# Patient Record
Sex: FEMALE | Race: WHITE | NOT HISPANIC OR LATINO | Employment: OTHER | URBAN - METROPOLITAN AREA
[De-identification: names, ages, dates, MRNs, and addresses within clinical notes are randomized per-mention and may not be internally consistent; named-entity substitution may affect disease eponyms.]

---

## 2019-08-16 ENCOUNTER — TELEPHONE (OUTPATIENT)
Dept: CARDIOLOGY | Facility: CLINIC | Age: 74
End: 2019-08-16

## 2019-08-16 NOTE — TELEPHONE ENCOUNTER
Pt left  asking about labs and CT scan for her AAA. S/W pt advised to go get the labs done now so they can check her liver and kidney function before getting the CT scan so everything can  Be done before her appt in [place] on 8/27. Dr. Faustin will Devi give her an order for CT or do you need to put in epic?

## 2019-09-17 ENCOUNTER — OUTSIDE FACILITY SERVICE (OUTPATIENT)
Dept: CARDIOLOGY | Facility: CLINIC | Age: 74
End: 2019-09-17

## 2019-09-17 PROCEDURE — 99214 OFFICE O/P EST MOD 30 MIN: CPT | Performed by: INTERNAL MEDICINE

## 2020-02-04 ENCOUNTER — TRANSCRIBE ORDERS (OUTPATIENT)
Dept: ADMINISTRATIVE | Facility: HOSPITAL | Age: 75
End: 2020-02-04

## 2020-02-04 ENCOUNTER — LAB (OUTPATIENT)
Dept: LAB | Facility: HOSPITAL | Age: 75
End: 2020-02-04

## 2020-02-04 DIAGNOSIS — J30.9 ALLERGIC RHINITIS, UNSPECIFIED SEASONALITY, UNSPECIFIED TRIGGER: ICD-10-CM

## 2020-02-04 DIAGNOSIS — J30.9 ALLERGIC RHINITIS, UNSPECIFIED SEASONALITY, UNSPECIFIED TRIGGER: Primary | ICD-10-CM

## 2020-02-04 LAB
HOLD SPECIMEN: NORMAL

## 2020-02-04 PROCEDURE — 86003 ALLG SPEC IGE CRUDE XTRC EA: CPT

## 2020-02-04 PROCEDURE — 82785 ASSAY OF IGE: CPT

## 2020-02-04 PROCEDURE — 36415 COLL VENOUS BLD VENIPUNCTURE: CPT

## 2020-02-04 PROCEDURE — 86003 ALLG SPEC IGE CRUDE XTRC EA: CPT | Performed by: OTOLARYNGOLOGY

## 2020-02-06 LAB — TOTAL IGE SMQN RAST: 21 IU/ML (ref 6–495)

## 2020-02-07 LAB
A ALTERNATA IGE QN: <0.1 KU/L
A FUMIGATUS IGE QN: <0.1 KU/L
AMER ROACH IGE QN: <0.1 KU/L
BAHIA GRASS IGE QN: <0.1 KU/L
BAYBERRY POLN IGE QN: <0.1 KU/L
BERMUDA GRASS IGE QN: <0.1 KU/L
BOXELDER IGE QN: <0.1 KU/L
C HERBARUM IGE QN: <0.1 KU/L
CAT DANDER IGG QN: <0.1 KU/L
COMMON RAGWEED IGE QN: <0.1 KU/L
CONV CLASS DESCRIPTION: NORMAL
D FARINAE IGE QN: <0.1 KU/L
D PTERONYSS IGE QN: <0.1 KU/L
DOG DANDER IGE QN: <0.1 KU/L
DOG FENNEL IGE QN: <0.1 KU/L
E PURPURASCENS IGE QN: <0.1 KU/L
ENGL PLANTAIN IGE QN: <0.1 KU/L
F MONILIFORME IGE QN: <0.1 KU/L
GOOSEFOOT IGE QN: <0.1 KU/L
GUM-TREE IGE QN: <0.1 KU/L
ITALIAN CYPRESS IGE QN: <0.1 KU/L
JOHNSON GRASS IGE QN: <0.1 KU/L
M RACEMOSUS IGE QN: <0.1 KU/L
MEADOW FESCUE IGE QN: <0.1 KU/L
P NOTATUM IGE QN: <0.1 KU/L
PEPPER TREE IGE QN: <0.1 KU/L
PER RYE GRASS IGE QN: <0.1 KU/L
QUEEN PALM IGE QN: <0.1 KU/L
S BOTRYOSUM IGE QN: <0.1 KU/L
SHEEP SORREL IGE QN: <0.1 KU/L
T210-IGE PRIVET, COMMON: <0.1 KU/L
VIRG LIVE OAK IGE QN: <0.1 KU/L
WHITE ELM IGE QN: <0.1 KU/L

## 2020-02-10 RX ORDER — IRBESARTAN 75 MG/1
TABLET ORAL
Qty: 90 TABLET | Refills: 0 | Status: SHIPPED | OUTPATIENT
Start: 2020-02-10 | End: 2020-05-07

## 2020-05-07 RX ORDER — IRBESARTAN 75 MG/1
TABLET ORAL
Qty: 90 TABLET | Refills: 0 | Status: SHIPPED | OUTPATIENT
Start: 2020-05-07 | End: 2020-08-05

## 2020-05-26 ENCOUNTER — OUTSIDE FACILITY SERVICE (OUTPATIENT)
Dept: CARDIOLOGY | Facility: CLINIC | Age: 75
End: 2020-05-26

## 2020-05-26 PROCEDURE — 99213 OFFICE O/P EST LOW 20 MIN: CPT | Performed by: INTERNAL MEDICINE

## 2020-08-05 RX ORDER — IRBESARTAN 75 MG/1
TABLET ORAL
Qty: 90 TABLET | Refills: 0 | Status: SHIPPED | OUTPATIENT
Start: 2020-08-05 | End: 2020-09-28

## 2020-09-28 RX ORDER — IRBESARTAN 75 MG/1
TABLET ORAL
Qty: 90 TABLET | Refills: 0 | Status: SHIPPED | OUTPATIENT
Start: 2020-09-28 | End: 2020-12-03

## 2020-09-28 NOTE — TELEPHONE ENCOUNTER
LOV 5/26/20 in [place]    Labs completed 5/20/20 in [place]    Next visit is scheduled for 11/10/20

## 2020-11-09 RX ORDER — ASPIRIN 81 MG/1
81 TABLET, CHEWABLE ORAL DAILY
COMMUNITY
Start: 2016-01-19

## 2020-11-09 RX ORDER — PANTOPRAZOLE SODIUM 40 MG/1
40 TABLET, DELAYED RELEASE ORAL DAILY
COMMUNITY
Start: 2018-06-18

## 2020-11-09 RX ORDER — CHLORAL HYDRATE 500 MG
1000 CAPSULE ORAL
COMMUNITY
Start: 2016-01-19 | End: 2020-12-16

## 2020-11-09 RX ORDER — LEVOTHYROXINE SODIUM 0.1 MG/1
100 TABLET ORAL DAILY
COMMUNITY
Start: 2016-01-19 | End: 2021-06-17 | Stop reason: DRUGHIGH

## 2020-11-09 RX ORDER — ALBUTEROL SULFATE 90 UG/1
2 AEROSOL, METERED RESPIRATORY (INHALATION) EVERY 6 HOURS PRN
COMMUNITY
Start: 2016-01-19

## 2020-11-10 ENCOUNTER — OUTSIDE FACILITY SERVICE (OUTPATIENT)
Dept: CARDIOLOGY | Facility: CLINIC | Age: 75
End: 2020-11-10

## 2020-11-10 ENCOUNTER — TRANSCRIBE ORDERS (OUTPATIENT)
Dept: CARDIOLOGY | Facility: HOSPITAL | Age: 75
End: 2020-11-10

## 2020-11-10 DIAGNOSIS — R06.02 SHORTNESS OF BREATH: Primary | ICD-10-CM

## 2020-11-10 PROCEDURE — 99214 OFFICE O/P EST MOD 30 MIN: CPT | Performed by: INTERNAL MEDICINE

## 2020-12-03 RX ORDER — IRBESARTAN 75 MG/1
TABLET ORAL
Qty: 90 TABLET | Refills: 0 | Status: SHIPPED | OUTPATIENT
Start: 2020-12-03 | End: 2021-02-09

## 2020-12-08 RX ORDER — SIMVASTATIN 40 MG
40 TABLET ORAL NIGHTLY
COMMUNITY
Start: 2020-10-14

## 2020-12-09 ENCOUNTER — HOSPITAL ENCOUNTER (OUTPATIENT)
Dept: CARDIOLOGY | Facility: HOSPITAL | Age: 75
Discharge: HOME | End: 2020-12-09

## 2020-12-09 VITALS
BODY MASS INDEX: 35.14 KG/M2 | DIASTOLIC BLOOD PRESSURE: 45 MMHG | HEIGHT: 60 IN | SYSTOLIC BLOOD PRESSURE: 136 MMHG | WEIGHT: 179 LBS

## 2020-12-09 DIAGNOSIS — R06.02 SHORTNESS OF BREATH: ICD-10-CM

## 2020-12-09 LAB
BH CV ECHO MEAS - ACS: 1.6 CM
BH CV ECHO MEAS - AO MAX PG (FULL): 5.5 MMHG
BH CV ECHO MEAS - AO MAX PG: 10.5 MMHG
BH CV ECHO MEAS - AO MEAN PG (FULL): 2.6 MMHG
BH CV ECHO MEAS - AO MEAN PG: 5.6 MMHG
BH CV ECHO MEAS - AO ROOT AREA (BSA CORRECTED): 1.6
BH CV ECHO MEAS - AO ROOT AREA: 6.8 CM^2
BH CV ECHO MEAS - AO ROOT DIAM: 2.9 CM
BH CV ECHO MEAS - AO V2 MAX: 162.2 CM/SEC
BH CV ECHO MEAS - AO V2 MEAN: 110.2 CM/SEC
BH CV ECHO MEAS - AO V2 VTI: 33.6 CM
BH CV ECHO MEAS - ASC AORTA: 4.2 CM
BH CV ECHO MEAS - AVA(I,A): 2 CM^2
BH CV ECHO MEAS - AVA(I,D): 2 CM^2
BH CV ECHO MEAS - AVA(V,A): 1.8 CM^2
BH CV ECHO MEAS - AVA(V,D): 1.8 CM^2
BH CV ECHO MEAS - BSA(HAYCOCK): 1.9 M^2
BH CV ECHO MEAS - BSA: 1.8 M^2
BH CV ECHO MEAS - BZI_BMI: 35 KILOGRAMS/M^2
BH CV ECHO MEAS - BZI_METRIC_HEIGHT: 152.4 CM
BH CV ECHO MEAS - BZI_METRIC_WEIGHT: 81.2 KG
BH CV ECHO MEAS - EDV(CUBED): 80.5 ML
BH CV ECHO MEAS - EDV(MOD-SP4): 49.9 ML
BH CV ECHO MEAS - EDV(TEICH): 83.9 ML
BH CV ECHO MEAS - EF(CUBED): 84.9 %
BH CV ECHO MEAS - EF(MOD-SP4): 77 %
BH CV ECHO MEAS - EF(TEICH): 78.4 %
BH CV ECHO MEAS - ESV(CUBED): 12.1 ML
BH CV ECHO MEAS - ESV(MOD-SP4): 11.5 ML
BH CV ECHO MEAS - ESV(TEICH): 18.1 ML
BH CV ECHO MEAS - FS: 46.8 %
BH CV ECHO MEAS - IVS/LVPW: 0.9
BH CV ECHO MEAS - IVSD: 0.89 CM
BH CV ECHO MEAS - LA DIMENSION: 4.3 CM
BH CV ECHO MEAS - LA/AO: 1.5
BH CV ECHO MEAS - LV DIASTOLIC VOL/BSA (35-75): 28 ML/M^2
BH CV ECHO MEAS - LV MASS(C)D: 131.7 GRAMS
BH CV ECHO MEAS - LV MASS(C)DI: 73.9 GRAMS/M^2
BH CV ECHO MEAS - LV MAX PG: 5 MMHG
BH CV ECHO MEAS - LV MEAN PG: 2.9 MMHG
BH CV ECHO MEAS - LV SYSTOLIC VOL/BSA (12-30): 6.4 ML/M^2
BH CV ECHO MEAS - LV V1 MAX: 112.1 CM/SEC
BH CV ECHO MEAS - LV V1 MEAN: 81.2 CM/SEC
BH CV ECHO MEAS - LV V1 VTI: 26.2 CM
BH CV ECHO MEAS - LVIDD: 4.3 CM
BH CV ECHO MEAS - LVIDS: 2.3 CM
BH CV ECHO MEAS - LVOT AREA: 2.5 CM^2
BH CV ECHO MEAS - LVOT DIAM: 1.8 CM
BH CV ECHO MEAS - LVPWD: 0.99 CM
BH CV ECHO MEAS - MV A MAX VEL: 120.9 CM/SEC
BH CV ECHO MEAS - MV DEC SLOPE: 458.9 CM/SEC^2
BH CV ECHO MEAS - MV DEC TIME: 0.2 SEC
BH CV ECHO MEAS - MV E MAX VEL: 90.7 CM/SEC
BH CV ECHO MEAS - MV E/A: 0.75
BH CV ECHO MEAS - MV MAX PG: 9.7 MMHG
BH CV ECHO MEAS - MV MEAN PG: 4.1 MMHG
BH CV ECHO MEAS - MV V2 MAX: 155.6 CM/SEC
BH CV ECHO MEAS - MV V2 MEAN: 95.3 CM/SEC
BH CV ECHO MEAS - MV V2 VTI: 36.3 CM
BH CV ECHO MEAS - MVA(VTI): 1.8 CM^2
BH CV ECHO MEAS - PA ACC TIME: 0.17 SEC
BH CV ECHO MEAS - PA PR(ACCEL): 1.1 MMHG
BH CV ECHO MEAS - PULM A REVS DUR: 0.05 SEC
BH CV ECHO MEAS - PULM A REVS VEL: 30.4 CM/SEC
BH CV ECHO MEAS - PULM DIAS VEL: 55.7 CM/SEC
BH CV ECHO MEAS - PULM S/D: 1.2
BH CV ECHO MEAS - PULM SYS VEL: 68.5 CM/SEC
BH CV ECHO MEAS - RAP SYSTOLE: 3 MMHG
BH CV ECHO MEAS - RV MAX PG: 2 MMHG
BH CV ECHO MEAS - RV MEAN PG: 1 MMHG
BH CV ECHO MEAS - RV V1 MAX: 70 CM/SEC
BH CV ECHO MEAS - RV V1 MEAN: 47.8 CM/SEC
BH CV ECHO MEAS - RV V1 VTI: 13.2 CM
BH CV ECHO MEAS - RVDD: 2.9 CM
BH CV ECHO MEAS - RVSP: 29.2 MMHG
BH CV ECHO MEAS - SI(AO): 127.4 ML/M^2
BH CV ECHO MEAS - SI(CUBED): 38.4 ML/M^2
BH CV ECHO MEAS - SI(LVOT): 37.4 ML/M^2
BH CV ECHO MEAS - SI(MOD-SP4): 21.6 ML/M^2
BH CV ECHO MEAS - SI(TEICH): 36.9 ML/M^2
BH CV ECHO MEAS - SV(AO): 226.9 ML
BH CV ECHO MEAS - SV(CUBED): 68.3 ML
BH CV ECHO MEAS - SV(LVOT): 66.6 ML
BH CV ECHO MEAS - SV(MOD-SP4): 38.5 ML
BH CV ECHO MEAS - SV(TEICH): 65.8 ML
BH CV ECHO MEAS - TR MAX VEL: 256.1 CM/SEC
BH CV STRESS COMMENTS STAGE 1: NORMAL
BH CV STRESS DOSE REGADENOSON STAGE 1: 0.4
BH CV STRESS DURATION MIN STAGE 1: 0
BH CV STRESS DURATION SEC STAGE 1: 10
BH CV STRESS PROTOCOL 1: NORMAL
BH CV STRESS RECOVERY BP: NORMAL MMHG
BH CV STRESS RECOVERY HR: 53 BPM
BH CV STRESS STAGE 1: 1
LV EF 2D ECHO EST: 70 %
LV EF NUC BP: 68 %
MAXIMAL PREDICTED HEART RATE: 146 BPM
MAXIMAL PREDICTED HEART RATE: 146 BPM
STRESS BASELINE BP: NORMAL MMHG
STRESS BASELINE HR: 78 BPM
STRESS TARGET HR: 124 BPM
STRESS TARGET HR: 124 BPM

## 2020-12-09 PROCEDURE — 93017 CV STRESS TEST TRACING ONLY: CPT

## 2020-12-09 PROCEDURE — 25010000002 REGADENOSON 0.4 MG/5ML SOLUTION: Performed by: INTERNAL MEDICINE

## 2020-12-09 PROCEDURE — 93016 CV STRESS TEST SUPVJ ONLY: CPT | Performed by: INTERNAL MEDICINE

## 2020-12-09 PROCEDURE — 78452 HT MUSCLE IMAGE SPECT MULT: CPT

## 2020-12-09 PROCEDURE — A9500 TC99M SESTAMIBI: HCPCS | Performed by: INTERNAL MEDICINE

## 2020-12-09 PROCEDURE — 93306 TTE W/DOPPLER COMPLETE: CPT

## 2020-12-09 PROCEDURE — 0 TECHNETIUM SESTAMIBI: Performed by: INTERNAL MEDICINE

## 2020-12-09 PROCEDURE — 78452 HT MUSCLE IMAGE SPECT MULT: CPT | Performed by: INTERNAL MEDICINE

## 2020-12-09 PROCEDURE — 93306 TTE W/DOPPLER COMPLETE: CPT | Performed by: INTERNAL MEDICINE

## 2020-12-09 PROCEDURE — 93018 CV STRESS TEST I&R ONLY: CPT | Performed by: INTERNAL MEDICINE

## 2020-12-09 RX ADMIN — TECHNETIUM TC 99M SESTAMIBI 1 DOSE: 1 INJECTION INTRAVENOUS at 13:45

## 2020-12-09 RX ADMIN — REGADENOSON 0.4 MG: 0.08 INJECTION, SOLUTION INTRAVENOUS at 15:15

## 2020-12-15 ENCOUNTER — OUTSIDE FACILITY SERVICE (OUTPATIENT)
Dept: CARDIOLOGY | Facility: CLINIC | Age: 75
End: 2020-12-15

## 2020-12-15 PROCEDURE — 99213 OFFICE O/P EST LOW 20 MIN: CPT | Performed by: INTERNAL MEDICINE

## 2021-01-12 ENCOUNTER — OUTSIDE FACILITY SERVICE (OUTPATIENT)
Dept: CARDIOLOGY | Facility: CLINIC | Age: 76
End: 2021-01-12

## 2021-01-12 DIAGNOSIS — I25.110 CORONARY ARTERY DISEASE INVOLVING NATIVE CORONARY ARTERY OF NATIVE HEART WITH UNSTABLE ANGINA PECTORIS (HCC): Primary | ICD-10-CM

## 2021-01-12 PROCEDURE — 99214 OFFICE O/P EST MOD 30 MIN: CPT | Performed by: INTERNAL MEDICINE

## 2021-01-13 PROBLEM — I25.110 CORONARY ARTERY DISEASE INVOLVING NATIVE CORONARY ARTERY OF NATIVE HEART WITH UNSTABLE ANGINA PECTORIS: Status: ACTIVE | Noted: 2021-01-13

## 2021-01-13 RX ORDER — ISOSORBIDE MONONITRATE 30 MG/1
30 TABLET, EXTENDED RELEASE ORAL DAILY
COMMUNITY
Start: 2020-12-16 | End: 2021-06-17

## 2021-01-13 RX ORDER — POTASSIUM CHLORIDE 750 MG/1
10 TABLET, FILM COATED, EXTENDED RELEASE ORAL NIGHTLY
COMMUNITY
Start: 2020-12-16 | End: 2021-06-23

## 2021-01-13 RX ORDER — ACETAMINOPHEN 500 MG
500 TABLET ORAL 2 TIMES DAILY
COMMUNITY

## 2021-01-13 RX ORDER — METOPROLOL SUCCINATE 25 MG/1
25 TABLET, EXTENDED RELEASE ORAL DAILY
COMMUNITY
Start: 2020-12-16 | End: 2021-01-14

## 2021-01-13 RX ORDER — POLYETHYLENE GLYCOL 3350 17 G/17G
17 POWDER, FOR SOLUTION ORAL NIGHTLY
COMMUNITY

## 2021-01-14 RX ORDER — METOPROLOL SUCCINATE 25 MG/1
TABLET, EXTENDED RELEASE ORAL
Qty: 90 TABLET | Refills: 2 | Status: SHIPPED | OUTPATIENT
Start: 2021-01-14 | End: 2021-06-17 | Stop reason: DRUGHIGH

## 2021-01-15 ENCOUNTER — LAB (OUTPATIENT)
Dept: LAB | Facility: HOSPITAL | Age: 76
End: 2021-01-15

## 2021-01-15 ENCOUNTER — HOSPITAL ENCOUNTER (OUTPATIENT)
Dept: CARDIOLOGY | Facility: HOSPITAL | Age: 76
Discharge: HOME | End: 2021-01-15

## 2021-01-15 ENCOUNTER — TRANSCRIBE ORDERS (OUTPATIENT)
Dept: ADMINISTRATIVE | Facility: HOSPITAL | Age: 76
End: 2021-01-15

## 2021-01-15 DIAGNOSIS — Z01.818 PRE-OP TESTING: ICD-10-CM

## 2021-01-15 DIAGNOSIS — I25.110 CORONARY ARTERY DISEASE INVOLVING NATIVE CORONARY ARTERY OF NATIVE HEART WITH UNSTABLE ANGINA PECTORIS (HCC): ICD-10-CM

## 2021-01-15 DIAGNOSIS — Z01.818 PRE-OP TESTING: Primary | ICD-10-CM

## 2021-01-15 LAB
ANION GAP SERPL CALCULATED.3IONS-SCNC: 9.3 MMOL/L (ref 5–15)
BUN SERPL-MCNC: 16 MG/DL (ref 8–23)
BUN/CREAT SERPL: 21.3 (ref 7–25)
CALCIUM SPEC-SCNC: 9.6 MG/DL (ref 8.6–10.5)
CHLORIDE SERPL-SCNC: 107 MMOL/L (ref 98–107)
CO2 SERPL-SCNC: 26.7 MMOL/L (ref 22–29)
CREAT SERPL-MCNC: 0.75 MG/DL (ref 0.57–1)
DEPRECATED RDW RBC AUTO: 39.4 FL (ref 37–54)
ERYTHROCYTE [DISTWIDTH] IN BLOOD BY AUTOMATED COUNT: 13.7 % (ref 12.3–15.4)
GFR SERPL CREATININE-BSD FRML MDRD: 76 ML/MIN/1.73
GLUCOSE SERPL-MCNC: 99 MG/DL (ref 65–99)
HCT VFR BLD AUTO: 38.1 % (ref 34–46.6)
HGB BLD-MCNC: 12.4 G/DL (ref 12–15.9)
MCH RBC QN AUTO: 25.8 PG (ref 26.6–33)
MCHC RBC AUTO-ENTMCNC: 32.5 G/DL (ref 31.5–35.7)
MCV RBC AUTO: 79.4 FL (ref 79–97)
PLATELET # BLD AUTO: 340 10*3/MM3 (ref 140–450)
PMV BLD AUTO: 11.4 FL (ref 6–12)
POTASSIUM SERPL-SCNC: 4 MMOL/L (ref 3.5–5.2)
RBC # BLD AUTO: 4.8 10*6/MM3 (ref 3.77–5.28)
SARS-COV-2 ORF1AB RESP QL NAA+PROBE: NOT DETECTED
SODIUM SERPL-SCNC: 143 MMOL/L (ref 136–145)
WBC # BLD AUTO: 9.21 10*3/MM3 (ref 3.4–10.8)

## 2021-01-15 PROCEDURE — 93005 ELECTROCARDIOGRAM TRACING: CPT | Performed by: INTERNAL MEDICINE

## 2021-01-15 PROCEDURE — C9803 HOPD COVID-19 SPEC COLLECT: HCPCS

## 2021-01-15 PROCEDURE — 85027 COMPLETE CBC AUTOMATED: CPT

## 2021-01-15 PROCEDURE — U0004 COV-19 TEST NON-CDC HGH THRU: HCPCS

## 2021-01-15 PROCEDURE — 93010 ELECTROCARDIOGRAM REPORT: CPT | Performed by: INTERNAL MEDICINE

## 2021-01-15 PROCEDURE — 36415 COLL VENOUS BLD VENIPUNCTURE: CPT

## 2021-01-15 PROCEDURE — 80048 BASIC METABOLIC PNL TOTAL CA: CPT

## 2021-01-16 LAB — QT INTERVAL: 406 MS

## 2021-01-18 ENCOUNTER — HOSPITAL ENCOUNTER (OUTPATIENT)
Facility: HOSPITAL | Age: 76
Setting detail: HOSPITAL OUTPATIENT SURGERY
Discharge: HOME | End: 2021-01-18
Attending: INTERNAL MEDICINE | Admitting: INTERNAL MEDICINE

## 2021-01-18 VITALS
HEIGHT: 59 IN | SYSTOLIC BLOOD PRESSURE: 115 MMHG | RESPIRATION RATE: 19 BRPM | BODY MASS INDEX: 36.27 KG/M2 | TEMPERATURE: 97.2 F | OXYGEN SATURATION: 95 % | HEART RATE: 72 BPM | WEIGHT: 179.9 LBS | DIASTOLIC BLOOD PRESSURE: 53 MMHG

## 2021-01-18 DIAGNOSIS — I25.110 CORONARY ARTERY DISEASE INVOLVING NATIVE CORONARY ARTERY OF NATIVE HEART WITH UNSTABLE ANGINA PECTORIS (HCC): ICD-10-CM

## 2021-01-18 PROCEDURE — 99152 MOD SED SAME PHYS/QHP 5/>YRS: CPT | Performed by: INTERNAL MEDICINE

## 2021-01-18 PROCEDURE — C1894 INTRO/SHEATH, NON-LASER: HCPCS | Performed by: INTERNAL MEDICINE

## 2021-01-18 PROCEDURE — 25010000002 FENTANYL CITRATE (PF) 100 MCG/2ML SOLUTION: Performed by: INTERNAL MEDICINE

## 2021-01-18 PROCEDURE — 93454 CORONARY ARTERY ANGIO S&I: CPT | Performed by: INTERNAL MEDICINE

## 2021-01-18 PROCEDURE — 0 IOPAMIDOL PER 1 ML: Performed by: INTERNAL MEDICINE

## 2021-01-18 PROCEDURE — C1769 GUIDE WIRE: HCPCS | Performed by: INTERNAL MEDICINE

## 2021-01-18 PROCEDURE — 25010000002 MIDAZOLAM PER 1 MG: Performed by: INTERNAL MEDICINE

## 2021-01-18 RX ORDER — ALUMINA, MAGNESIA, AND SIMETHICONE 2400; 2400; 240 MG/30ML; MG/30ML; MG/30ML
15 SUSPENSION ORAL EVERY 6 HOURS PRN
Status: DISCONTINUED | OUTPATIENT
Start: 2021-01-18 | End: 2021-01-19 | Stop reason: HOSPADM

## 2021-01-18 RX ORDER — ACETAMINOPHEN 325 MG/1
650 TABLET ORAL EVERY 4 HOURS PRN
Status: DISCONTINUED | OUTPATIENT
Start: 2021-01-18 | End: 2021-01-19 | Stop reason: HOSPADM

## 2021-01-18 RX ORDER — DIPHENHYDRAMINE HCL 25 MG
25 CAPSULE ORAL EVERY 6 HOURS PRN
Status: DISCONTINUED | OUTPATIENT
Start: 2021-01-18 | End: 2021-01-19 | Stop reason: HOSPADM

## 2021-01-18 RX ORDER — LIDOCAINE HYDROCHLORIDE 20 MG/ML
INJECTION, SOLUTION INFILTRATION; PERINEURAL AS NEEDED
Status: DISCONTINUED | OUTPATIENT
Start: 2021-01-18 | End: 2021-01-18 | Stop reason: HOSPADM

## 2021-01-18 RX ORDER — MIDAZOLAM HYDROCHLORIDE 1 MG/ML
INJECTION INTRAMUSCULAR; INTRAVENOUS AS NEEDED
Status: DISCONTINUED | OUTPATIENT
Start: 2021-01-18 | End: 2021-01-18 | Stop reason: HOSPADM

## 2021-01-18 RX ORDER — ONDANSETRON 2 MG/ML
4 INJECTION INTRAMUSCULAR; INTRAVENOUS EVERY 6 HOURS PRN
Status: DISCONTINUED | OUTPATIENT
Start: 2021-01-18 | End: 2021-01-19 | Stop reason: HOSPADM

## 2021-01-18 RX ORDER — SODIUM CHLORIDE 9 MG/ML
250 INJECTION, SOLUTION INTRAVENOUS ONCE AS NEEDED
Status: DISCONTINUED | OUTPATIENT
Start: 2021-01-18 | End: 2021-01-19 | Stop reason: HOSPADM

## 2021-01-18 RX ORDER — ONDANSETRON 4 MG/1
4 TABLET, FILM COATED ORAL EVERY 6 HOURS PRN
Status: DISCONTINUED | OUTPATIENT
Start: 2021-01-18 | End: 2021-01-19 | Stop reason: HOSPADM

## 2021-01-18 RX ORDER — FENTANYL CITRATE 50 UG/ML
INJECTION, SOLUTION INTRAMUSCULAR; INTRAVENOUS AS NEEDED
Status: DISCONTINUED | OUTPATIENT
Start: 2021-01-18 | End: 2021-01-18 | Stop reason: HOSPADM

## 2021-01-18 RX ORDER — SODIUM CHLORIDE 9 MG/ML
30 INJECTION, SOLUTION INTRAVENOUS CONTINUOUS
Status: DISCONTINUED | OUTPATIENT
Start: 2021-01-18 | End: 2021-01-19 | Stop reason: HOSPADM

## 2021-01-18 RX ADMIN — SODIUM CHLORIDE 30 ML/HR: 9 INJECTION, SOLUTION INTRAVENOUS at 12:56

## 2021-01-18 NOTE — CONSULTS
75-year-old white female patient with multiple cardiac risk factors admitted to hospital for cardiac catheterization due to recurrent symptoms of chest pain/unstable angina    Patient medications were reviewed  Social history denies of smoking alcohol drug abuse  Family history of hypertension  Review of system all the systems are reviewed otherwise negative    Physical exam  Patient vitals stable  Neck no JVP elevation or bruit lungs bilateral entry mostly clear heart sounds S1-S2 regular no significant murmur gallop extremities no edema bilateral pulses present equal    Assessment and plan CAD  Admitted for cardiac catheterization

## 2021-01-19 NOTE — DISCHARGE INSTRUCTIONS
Post Cath Instructions    Prescriptions given?  no   Admission Meds Returned? n/a  Medication Sheets given? yes  Smoking Cessation Info Given yes  Education Booklet given? yes    Call Dr. Faustin’s office to schedule a follow up appointment in 2-4 weeks at [phone].  Specific Physician Instructions: ***    1) Drink plenty of fluids for the next 24 hours.  This helps to eliminate the dye used in your procedure through urination.  You may resume a normal diet; however, try to avoid foods that would cause gas or constipation.    2) Sedative medication given to you during your catheterization may decrease your judgement and reaction time for up to 24-48 hours.  Therefore:  a. DO NOT drive or operate hazardous machinery (48 hours)  b. DO NOT consume alcoholic beverages  c. DO NOT make any important/legal decisions  d. Have someone stay with you for at least 24 hours    3) To allow proper healing and prevent bleeding, the following activities are to be strictly avoided for the next 24-48 hours:  a. Excessive bending at wound site  b. Straining (anything that would tense up muscles around the affected puncture site)  c. Lifting objects greater than 5 pounds, pushing, or pulling for 5 days  i. For Arm Cases:  1. No flexing at the puncture site, such as hammering, golfing, bowling, or swinging any objects  ii. For Groin Cases:  1. Refrain from sexual activity  2. Refrain from running or vigorous walking  3. No prolonged sitting or standing  4. Limit stair climbing as much as possible    4) Keep the puncture site clean and dry.  You may remove the dressing tomorrow and replace it with a band-aid for at least one additional day.  Gently clean the site with mild soap and water.  No scrubbing/rubbing and lightly pat the area dry.  Showers are acceptable; however, avoid submerging in water (tub baths, hot tubs, swimming pools, dishwater, etc…) for at least one week.  The site should be completely healed before resuming these  activities to reduce the risk of infection.  Check the site often.  Watch for signs and symptoms of infection and notify your physician if any of the following occur:  a. Bleeding or an increase in swelling at the puncture site  b. Fever  c. Increased soreness around puncture site  d. Foul odor or significant drainage from the puncture site  e. Swelling, redness, or warmth at the puncture site    **A bruise or small “pea sized” lump under the skin at the puncture site is not unusual.  This should disappear within 3-4 weeks.**  5) CONTACT YOUR PHYSICIAN OR CALL 911 IF YOU EXPERIENCE ANY OF THE FOLLOWING:  a. Increased angina (chest pain) or frequent sensations of pressure, burning, pain, or other discomfort in the chest, arm, jaws, or stomach  b. Lightheadedness, dizziness, faint feeling, sweating, or difficulty breathing  c. Odd sensation changes like numbness, tingling, coldness, or pain in the arm or leg in which the catheter was inserted  d. Limb in which the catheter was inserted becomes pale/bluish in color    IMPORTANT:  Although this occurs very rarely, if you should develop bright red or excessive bleeding, feel a “pop” inside at the insertion site, or notice a sudden increase in swelling larger than a walnut, you should call 911.  Hold continuous firm pressure to the access site until emergency personnel arrive.  It is best if someone else can do this for you.

## 2021-02-09 RX ORDER — IRBESARTAN 75 MG/1
TABLET ORAL
Qty: 90 TABLET | Refills: 0 | Status: SHIPPED | OUTPATIENT
Start: 2021-02-09 | End: 2021-08-12

## 2021-02-23 ENCOUNTER — OUTSIDE FACILITY SERVICE (OUTPATIENT)
Dept: CARDIOLOGY | Facility: CLINIC | Age: 76
End: 2021-02-23

## 2021-02-23 PROCEDURE — 99214 OFFICE O/P EST MOD 30 MIN: CPT | Performed by: INTERNAL MEDICINE

## 2021-05-05 ENCOUNTER — OFFICE VISIT (OUTPATIENT)
Dept: NEUROSURGERY | Facility: CLINIC | Age: 76
End: 2021-05-05

## 2021-05-05 VITALS
HEIGHT: 60 IN | BODY MASS INDEX: 37.3 KG/M2 | DIASTOLIC BLOOD PRESSURE: 81 MMHG | HEART RATE: 87 BPM | SYSTOLIC BLOOD PRESSURE: 136 MMHG | WEIGHT: 190 LBS

## 2021-05-05 DIAGNOSIS — M54.50 CHRONIC MIDLINE LOW BACK PAIN WITHOUT SCIATICA: Primary | ICD-10-CM

## 2021-05-05 DIAGNOSIS — G89.29 CHRONIC MIDLINE LOW BACK PAIN WITHOUT SCIATICA: Primary | ICD-10-CM

## 2021-05-05 PROCEDURE — 99203 OFFICE O/P NEW LOW 30 MIN: CPT | Performed by: SURGERY

## 2021-05-05 RX ORDER — LEVOTHYROXINE SODIUM 0.12 MG/1
125 TABLET ORAL DAILY
COMMUNITY
Start: 2021-04-26

## 2021-05-05 RX ORDER — HYDROCHLOROTHIAZIDE 25 MG/1
25 TABLET ORAL DAILY
COMMUNITY
Start: 2021-04-23

## 2021-05-05 NOTE — PROGRESS NOTES
Subjective   History of Present Illness: Stephani Daniels is a 76 y.o. female seen in neurosurgery clinic for the first time on 5/5/2021.  He is 76-year-old female with chronic back pain follow-up well over 20 years.  Patient has been seen and evaluated and treated by pain management, physical therapy and chiropractor.  Patient received inflammatory epidural steroid injections on 6/19 and also 7/19.  Subsequently she underwent branch blocks 8/19 and 9/19 and is also undergone radiofrequency ablation in 9/19.  All have provided some relief yet with return of pain.  She states that she has received significant relief with chiropractic manipulations.  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  Family the patient continues to have lumbar pain without radiation in her legs.  Degree of axial loading may be apparent as she has some pain after standing for prolonged period of time.  There is no lumbar claudication and she is without sciatica.  Patient has no pain in her thoracic spine or any pain or irritation in her rib cage.  Patient has no significant weakness or numbness in her legs and has been no reports of bowel bladder incontinence.  The number of imaging studies on disc were sent for review.  The imaging studies were obtained from [facility].  Unfortunately none of the imaging disc could be opened up for review.  Patient would like to avoid any surgical intervention.    History of Present Illness    The following portions of the patient's history were reviewed and updated as appropriate: allergies, current medications, past family history, past medical history, past social history, past surgical history and problem list.    Review of Systems   Constitutional: Negative.    HENT: Negative.    Respiratory: Positive for shortness of breath.   "  Musculoskeletal: Positive for back pain.   Neurological: Negative.        Objective     ./81 (BP Location: Left arm, Patient Position: Sitting, Cuff Size: Adult)   Pulse 87   Ht 152.4 cm (60\")   Wt 86.2 kg (190 lb)   BMI 37.11 kg/m²    Body mass index is 37.11 kg/m².      Physical Exam  Constitutional:       Appearance: Normal appearance. She is obese.   HENT:      Head: Normocephalic and atraumatic.   Eyes:      Extraocular Movements: Extraocular movements intact.      Pupils: Pupils are equal, round, and reactive to light.   Pulmonary:      Effort: Pulmonary effort is normal.      Breath sounds: Normal breath sounds.   Abdominal:      Palpations: Abdomen is soft.   Musculoskeletal:      Cervical back: Normal range of motion and neck supple.      Comments: Mild flattening of the lordotic curve.  No point tenderness with palpation of the lumbar or thoracic spine.  No rib pain.   Neurological:      Mental Status: She is alert and oriented to person, place, and time.   Psychiatric:         Speech: Speech normal.       Neurologic Exam     Mental Status   Oriented to person, place, and time.   Attention: normal.   Speech: speech is normal     Cranial Nerves     CN III, IV, VI   Pupils are equal, round, and reactive to light.    Motor Exam 5/5 strength in the upper and lower extremities with negative Raheel sign.  Plantar responses flexor bilaterally.     Sensory Exam   Primary and cortical sensory modalities intact.         Assessment/Plan   Independent Review of Radiographic Studies:        Imaging studies from failure none available for review.    Medical Decision Making:      Stephani Danielsis a 76 y.o. female with chronic back pain for several years.  She has been treated with a number of injections in the spine with temporary benefit.  Patient has no history of location or sciatica with essentially normal neurological examination.  She has remote history of neuroforaminal schwannoma.  Unfortunately " imaging studies from [facility] were not available for review.  Have discussed findings and clinical summary recommendations with the patient.  Would like for her to continue her pain management which she has been receiving and would like to obtain imaging studies from here for additional recommendations.  We will schedule her for thoracic MRI without contrast and lumbar MRI without contrast, with follow-up in our clinic.    Procedures      Diagnoses and all orders for this visit:    1. Chronic midline low back pain without sciatica (Primary)  -     MRI Lumbar Spine Without Contrast; Future  -     MRI Thoracic Spine Without Contrast; Future        Obdulio Sabillon MD  05/05/21  12:08 EDT

## 2021-05-17 ENCOUNTER — TELEPHONE (OUTPATIENT)
Dept: NEUROSURGERY | Facility: CLINIC | Age: 76
End: 2021-05-17

## 2021-05-17 NOTE — TELEPHONE ENCOUNTER
Caller: HARRIS ALMANZA     Relationship: MEDICAL REVIEW/AUTH WITH     Best call back number: [phone]    What test/procedure requested: MRI    When is it needed: SCHEDULED ON 05/25/2021    Where is the test/procedure going to be performed: [facility]    Additional information or concerns: HARRIS STATES THAT MRI HAS BEEN SENT FOR MEDICAL REVIEW AND IS DUE TO CLOSE ON 05/21/2021. STATES IF DR CARNES WOULD LIKE TO DO A PEER TO PEER REVIEW PLEASE CONTACT INSURANCE AT [phone] OPTION 1.    PLEASE CALL REGARDING THIS MATTER, QUESTIONS OR CONCERNS.    THANK YOU!

## 2021-05-25 ENCOUNTER — HOSPITAL ENCOUNTER (OUTPATIENT)
Dept: MRI IMAGING | Facility: HOSPITAL | Age: 76
Discharge: HOME | End: 2021-05-25

## 2021-05-25 DIAGNOSIS — M54.50 CHRONIC MIDLINE LOW BACK PAIN WITHOUT SCIATICA: ICD-10-CM

## 2021-05-25 DIAGNOSIS — G89.29 CHRONIC MIDLINE LOW BACK PAIN WITHOUT SCIATICA: ICD-10-CM

## 2021-05-25 PROCEDURE — 72146 MRI CHEST SPINE W/O DYE: CPT

## 2021-05-25 PROCEDURE — 72148 MRI LUMBAR SPINE W/O DYE: CPT

## 2021-06-17 ENCOUNTER — TELEPHONE (OUTPATIENT)
Dept: CARDIOLOGY | Facility: CLINIC | Age: 76
End: 2021-06-17

## 2021-06-17 RX ORDER — METOPROLOL SUCCINATE 25 MG/1
12.5 TABLET, EXTENDED RELEASE ORAL DAILY
COMMUNITY
End: 2023-03-16

## 2021-06-17 NOTE — TELEPHONE ENCOUNTER
Pt LMOM and stated having low BP and nauseated.  Called pt back and she stopped taking metoprolol and imdur for a couple days and started feeling better and BP started going back up. BP 99/66 & BP 97/57. Then she started both meds back again and felt nauseated and BP went back down. She wanted to know about getting some meds changed. Please advise.

## 2021-06-23 RX ORDER — POTASSIUM CHLORIDE 750 MG/1
TABLET, FILM COATED, EXTENDED RELEASE ORAL
Qty: 90 TABLET | Refills: 0 | Status: SHIPPED | OUTPATIENT
Start: 2021-06-23 | End: 2021-10-01

## 2021-06-25 ENCOUNTER — OFFICE VISIT (OUTPATIENT)
Dept: NEUROSURGERY | Facility: CLINIC | Age: 76
End: 2021-06-25

## 2021-06-25 VITALS
WEIGHT: 187 LBS | BODY MASS INDEX: 36.71 KG/M2 | SYSTOLIC BLOOD PRESSURE: 132 MMHG | DIASTOLIC BLOOD PRESSURE: 78 MMHG | HEART RATE: 87 BPM | HEIGHT: 60 IN

## 2021-06-25 DIAGNOSIS — M47.816 FACET ARTHROPATHY, LUMBAR: Primary | ICD-10-CM

## 2021-06-25 DIAGNOSIS — D49.2 NERVE SHEATH TUMOR: ICD-10-CM

## 2021-06-25 DIAGNOSIS — M51.36 DDD (DEGENERATIVE DISC DISEASE), LUMBAR: ICD-10-CM

## 2021-06-25 PROBLEM — M51.369 DDD (DEGENERATIVE DISC DISEASE), LUMBAR: Status: ACTIVE | Noted: 2021-06-25

## 2021-06-25 PROCEDURE — 99214 OFFICE O/P EST MOD 30 MIN: CPT | Performed by: NURSE PRACTITIONER

## 2021-06-25 RX ORDER — METHYLPREDNISOLONE 4 MG/1
TABLET ORAL
Qty: 1 EACH | Refills: 0 | Status: SHIPPED | OUTPATIENT
Start: 2021-06-25 | End: 2021-07-09

## 2021-06-25 NOTE — PROGRESS NOTES
Subjective   History of Present Illness: Stephani Daniels is a 76 y.o. female being seen for follow-up of chronic midline low back pain and review of imaging.   Patient has no new complaints or acute symptoms.  She did suffer a fall after her last visit which she landed backwards on her back.  She did hit her head and was evaluated [place] ER in [place].  Per her report, all imaging was okay.  She just remains sore in her lower back.  Patient has some chronic low back pain that extends slightly bilateral paraspinally.  It mainly just causes her some achiness in her back with no radiation into her buttocks hips or thighs.  She states that it only bothers her when she stands.  She has accompanying stiffness as well.  She does occasionally experience left anterior thigh numbness that resolves on its own.  Patient has been seen with pain management about a year to year and a half ago and did receive benefit from some injections.  She stopped going due to the Covid outbreak and has not returned.  Patient denies any thoracic pain, arm pain, torso paresthesias, rib pain, leg pain, leg weakness, or gait issues.    Per review of records patient was last evaluated by Dr. Sabillon on 5/5/2021. Patient had been seen and evaluated and treated with pain management, physical therapy and chiropractic services. Her last epidural steroid injections medial branch blocks and radiofrequency ablations were in 2019. These provided some relief but patient did have return of her pain. 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 Patient continued to have lumbar pain with no radiation into her legs and a degree of axial loading as some of her pain after standing was exacerbated. Patient had no lumbar claudication or sciatica, no pain in her thoracic spine or irritation to her rib cage.  Patient's physical exam demonstrated mild flattening of the lordotic curve with no point tenderness or rib pain. She had no motor weakness in her lower or upper extremities. During this visit her studies from outside facility were not available for review. Plans for her to continue with pain management and to obtain imaging for any additional recommendations.      Back Pain  This is a chronic problem. The current episode started more than 1 year ago. The problem occurs daily. The problem is unchanged. The pain is present in the lumbar spine. The quality of the pain is described as aching. The pain does not radiate. The pain is mild. The pain is worse during the day. The symptoms are aggravated by standing. Pertinent negatives include no abdominal pain, bladder incontinence, bowel incontinence, chest pain, fever, headaches, leg pain, numbness, paresthesias, pelvic pain, perianal numbness, tingling or weakness. Risk factors include obesity. She has tried NSAIDs (Biofreeze) for the symptoms.       The following portions of the patient's history were reviewed and updated as appropriate: allergies, current medications, past family history, past medical history, past social history, past surgical history and problem list.    Review of Systems   Constitutional: Negative for chills, fatigue and fever.   HENT: Negative for postnasal drip, sinus pressure, sinus pain and sneezing.    Eyes: Negative for photophobia, pain and visual disturbance.   Respiratory: Negative for cough, chest tightness, shortness of breath and wheezing.    Cardiovascular: Negative for chest pain and palpitations.   Gastrointestinal: Negative for abdominal pain, bowel incontinence, constipation, diarrhea, nausea and vomiting.   Genitourinary: Negative for bladder incontinence, difficulty urinating, flank pain and pelvic pain.   Musculoskeletal: Positive for arthralgias, back pain and gait problem. Negative for neck pain and neck stiffness.   Skin:  "Negative for rash and wound.   Neurological: Negative for dizziness, tingling, seizures, speech difficulty, weakness, numbness, headaches and paresthesias.   Psychiatric/Behavioral: Negative for behavioral problems, confusion and decreased concentration.   All other systems reviewed and are negative.      Objective     ./78 (BP Location: Left arm, Patient Position: Sitting, Cuff Size: Adult)   Pulse 87   Ht 152.4 cm (60\")   Wt 84.8 kg (187 lb)   BMI 36.52 kg/m²    Body mass index is 36.52 kg/m².      Physical Exam  Vitals reviewed.   Eyes:      Pupils: Pupils are equal, round, and reactive to light.   Pulmonary:      Effort: Pulmonary effort is normal.   Neurological:      Mental Status: She is oriented to person, place, and time.      Gait: Gait is intact.      Deep Tendon Reflexes: Strength normal.   Psychiatric:         Speech: Speech normal.       Neurologic Exam     Mental Status   Oriented to person, place, and time.   Speech: speech is normal   Level of consciousness: alert    Cranial Nerves     CN III, IV, VI   Pupils are equal, round, and reactive to light.    Motor Exam     Strength   Strength 5/5 throughout.     Sensory Exam   Light touch normal.     Gait, Coordination, and Reflexes     Gait  Gait: normal    Reflexes   Reflexes 2+ except as noted.   Right Agudelo: absent  Left Agudelo: absent  Right ankle clonus: absent  Left ankle clonus: absent    TTP lower lumbosacral spine midline  Positive pain on facet loading maneuvers      Assessment/Plan   Independent Review of Radiographic Studies:      I personally reviewed the images from the following studies.    MRI thoracic spine 5/25/2021  There is normal thoracic spinal cord signal and caliber throughout. Extending through the right T3-T4 neural foramen and adjacent paraspinal soft tissues a similar stable appearing  mass consistent with a nerve sheath tumor. There is multilevel degenerative changes. At T10-T11 disc spurring with superimposed " posterior disc protrusion and bilateral facet arthropathy combines to result in moderate bilateral neuroforaminal narrowing. There is also same at T11-T12 and T12-L1. No significant spinal canal narrowing.    Lumbar MRI 5/25/2021  Mild right curvature with chronic/degenerative grade 1 retrolisthesis of L3 on L4 and anterior listhesis of L4 on L5. It appears that L3 and L4 are fused. Multilevel spondylosis and facet changes. At L2-L3 a left posterior paracentral disc protrusion and DJD the combine to result in a partial effacement of the left lateral recess with some mass-effect on the traversing left L3 nerve root. Mild to moderate bilateral neuroforaminal narrowing seen here. At L4-L5 some moderate spinal canal stenosis and moderate to severe bilateral neuroforaminal narrowing with mass-effect on the exiting bilateral L4 nerve roots. At L5-S1 the exiting L5 nerve roots do come in contact with posterior osteophyte complex. Do not think there is any compression here at L5-S1.    Medical Decision Making:      Stephani Tyson a 76 y.o. female being seen for follow-up of her chronic low back pain and review of imaging.  Patient's physical exam demonstrated no motor deficits or red flags.  She was tender to palpation along the lumbosacral spine and had positive facet loading maneuvers.  She had no pain in her thoracic spine or sensory deficits.  Per report, patient's T3-T4 schwannoma is stable.  Patient has no radicular symptoms or pain complaints in this area.  We will continue to monitor this with serial imaging.  Her clinical presentation today is concordant with her lumbar spine imaging of her facet changes at L4-L5 and L5-S1.  Patient had no radicular pain or radiculopathy.   She has gotten some relief with her prior injections.  She has not had any injections since 2019 so recommendations are to continue with this therapy and with her current medication therapy.  I will also prescribe her Medrol Dosepak since  she did take a fall couple weeks ago to help with added inflammation this may have caused.  We will follow her up in 1 year with repeat imaging.  I did tell her that if she began to experience any leg pain or increased numbness and tingling in her thoracic area, arm pain, balance issues to call us sooner to be reevaluated.  Patient agreed she would do so.    Procedures      Diagnoses and all orders for this visit:    1. Facet arthropathy, lumbar (Primary)  -     Cancel: Ambulatory Referral to Pain Management  -     Ambulatory Referral to Pain Management    2. DDD (degenerative disc disease), lumbar    3. Nerve sheath tumor  -     MRI Thoracic Spine With & Without Contrast; Future    Other orders  -     methylPREDNISolone (MEDROL) 4 MG dose pack; Take as directed on package instructions.  Dispense: 1 each; Refill: 0      Return in about 1 year (around 6/25/2022) for Next scheduled follow up.    This patient was examined wearing appropriate personal protective equipment.     Stephani Daniels  reports that she has never smoked. She has never used smokeless tobacco..      Patient's Body mass index is 36.52 kg/m². indicating that she is obese (BMI >30). Obesity-related health conditions include the following: coronary heart disease and GERD. Obesity is unchanged. BMI is is above average; BMI management plan is completed. We discussed portion control and increasing exercise..        Patient's blood pressure was reviewed.  Recommendations for  a low-salt diet and exercise to maintain/improve BP in addition to taking any presribed medications.    Advance Care Planning   ACP discussion was held with the patient during this visit. Patient has an advance directive (not in EMR), copy requested.         PATRICIA Mendoza  06/25/21  11:22 EDT

## 2021-07-09 ENCOUNTER — OFFICE VISIT (OUTPATIENT)
Dept: PAIN MEDICINE | Facility: CLINIC | Age: 76
End: 2021-07-09

## 2021-07-09 ENCOUNTER — TELEPHONE (OUTPATIENT)
Dept: PAIN MEDICINE | Facility: HOSPITAL | Age: 76
End: 2021-07-09

## 2021-07-09 ENCOUNTER — HOSPITAL ENCOUNTER (OUTPATIENT)
Dept: GENERAL RADIOLOGY | Facility: HOSPITAL | Age: 76
Discharge: HOME | End: 2021-07-09
Admitting: STUDENT IN AN ORGANIZED HEALTH CARE EDUCATION/TRAINING PROGRAM

## 2021-07-09 VITALS
BODY MASS INDEX: 36.71 KG/M2 | OXYGEN SATURATION: 100 % | WEIGHT: 187 LBS | DIASTOLIC BLOOD PRESSURE: 83 MMHG | RESPIRATION RATE: 16 BRPM | HEIGHT: 60 IN | TEMPERATURE: 96.9 F | HEART RATE: 81 BPM | SYSTOLIC BLOOD PRESSURE: 136 MMHG

## 2021-07-09 DIAGNOSIS — M47.817 LUMBOSACRAL SPONDYLOSIS WITHOUT MYELOPATHY: Primary | ICD-10-CM

## 2021-07-09 DIAGNOSIS — M79.645 CHRONIC PAIN OF LEFT THUMB: ICD-10-CM

## 2021-07-09 DIAGNOSIS — G89.29 CHRONIC PAIN OF LEFT THUMB: ICD-10-CM

## 2021-07-09 PROCEDURE — G0463 HOSPITAL OUTPT CLINIC VISIT: HCPCS | Performed by: STUDENT IN AN ORGANIZED HEALTH CARE EDUCATION/TRAINING PROGRAM

## 2021-07-09 PROCEDURE — 73130 X-RAY EXAM OF HAND: CPT

## 2021-07-09 PROCEDURE — 99204 OFFICE O/P NEW MOD 45 MIN: CPT | Performed by: STUDENT IN AN ORGANIZED HEALTH CARE EDUCATION/TRAINING PROGRAM

## 2021-07-09 RX ORDER — GABAPENTIN 100 MG/1
100 CAPSULE ORAL 3 TIMES DAILY
Qty: 90 CAPSULE | Refills: 0 | Status: SHIPPED | OUTPATIENT
Start: 2021-07-09 | End: 2022-02-17

## 2021-07-09 NOTE — PROGRESS NOTES
CHIEF COMPLAINT  Chief Complaint   Patient presents with   • Back Pain     LEFT LEG GOING NUMB. FELL 5/17/21. NO NARCS.    • Knee Pain     BILATERAL HAS ARTHRITIS.    • Headache     EVERY DAY IN AFTERNOON.        Primary Care  Marija Cantu   Stephani Daniels is a 76 y.o. female  who presents for axial type low back pain with associated left lower extremity anterior thigh radiculopathy. Additionally, she complains of left groin pain after a fall. She states that she has had the back pain ongoing for many years. She is evaluated by a neurosurgeon who felt like she likely had a component of lumbosacral spondylosis and would benefit from medial branch blocks. She states that she received some form of injection in the past which beneficial however the records are unable to be obtained. She denies any red flags such as loss of bowel or bladder denies any significant weakness. She does have baseline shortness of breath due to either COPD or asthma.    History of Present Illness     Location: Axial low back, left anterior thigh, left thumb  Onset: Years ago  Duration: Progressively worsening  Timing: Constant throughout the day  Quality: Sharp, stabbing. Thigh numb and tingly  Severity: Today: 5       Last Week: 5       Worst: 7  Modifying Factors: The pain is worse with movement physical activity and standing. The pain improves with sitting and resting    Physical Therapy: no    Interval Update 07/09/2021:     The following portions of the patient's history were reviewed and updated as appropriate: allergies, current medications, past family history, past medical history, past social history, past surgical history and problem list.      Current Outpatient Medications:   •  acetaminophen (TYLENOL) 500 MG tablet, Take 500 mg by mouth 2 (two) times a day., Disp: , Rfl:   •  albuterol sulfate HFA (ProAir HFA) 108 (90 Base) MCG/ACT inhaler, Inhale 2 puffs Every 6 (Six) Hours As Needed for Wheezing (or cough).,  Disp: , Rfl:   •  aspirin 81 MG chewable tablet, Chew 81 mg Daily., Disp: , Rfl:   •  Calcium Citrate-Vitamin D (CALCIUM CITRATE + D3 PO), Take 1 tablet by mouth Daily., Disp: , Rfl:   •  cetirizine (zyrTEC) 10 MG tablet, Take 10 mg by mouth Every Night., Disp: , Rfl:   •  etodolac (LODINE) 400 MG tablet, Take 400 mg by mouth 2 (Two) Times a Day., Disp: , Rfl:   •  Fluticasone Furoate-Vilanterol (Breo Ellipta) 200-25 MCG/INH inhaler, Inhale 1 puff Daily., Disp: , Rfl:   •  hydroCHLOROthiazide (HYDRODIURIL) 25 MG tablet, Take 25 mg by mouth Daily. for blood pressure, Disp: , Rfl:   •  HYDROCHLOROTHIAZIDE PO, Take 25 mg by mouth Daily., Disp: , Rfl:   •  Hypertonic Nasal Wash (SINUS RINSE KIT NA), 1 application into the nostril(s) as directed by provider Daily., Disp: , Rfl:   •  irbesartan (AVAPRO) 75 MG tablet, Take 1 tablet by mouth once daily, Disp: 90 tablet, Rfl: 0  •  levothyroxine (SYNTHROID, LEVOTHROID) 125 MCG tablet, Take 125 mcg by mouth Daily., Disp: , Rfl:   •  Magnesium 250 MG tablet, Take 1 tablet by mouth Every Night., Disp: , Rfl:   •  Menthol, Topical Analgesic, 4 % gel, Apply 1 application topically As Needed., Disp: , Rfl:   •  metoprolol succinate XL (TOPROL-XL) 25 MG 24 hr tablet, Take 12.5 mg by mouth Daily., Disp: , Rfl:   •  montelukast (SINGULAIR) 10 MG tablet, Take 10 mg by mouth Every Night., Disp: , Rfl:   •  multivitamin with minerals tablet tablet, Take 1 tablet by mouth Daily., Disp: , Rfl:   •  pantoprazole (Protonix) 40 MG EC tablet, Take 40 mg by mouth Daily., Disp: , Rfl:   •  polyethylene glycol (MiraLax) 17 g packet, Take 17 g by mouth Every Night., Disp: , Rfl:   •  potassium chloride 10 MEQ CR tablet, Take 1 tablet by mouth once daily, Disp: 90 tablet, Rfl: 0  •  simvastatin (ZOCOR) 40 MG tablet, Take 40 mg by mouth Every Night., Disp: , Rfl:   •  gabapentin (NEURONTIN) 100 MG capsule, Take 1 capsule by mouth 3 (Three) Times a Day., Disp: 90 capsule, Rfl: 0    Review of Systems  "  Eyes: Negative for photophobia and visual disturbance.   Musculoskeletal: Positive for back pain, gait problem and joint swelling.   Neurological: Positive for numbness and headaches. Negative for weakness.       Vitals:    07/09/21 0901   BP: 136/83   Pulse: 81   Resp: 16   Temp: 96.9 °F (36.1 °C)   SpO2: 100%   Weight: 84.8 kg (187 lb)   Height: 152.4 cm (60\")   PainSc:   5       Objective   Physical Exam  Vitals and nursing note reviewed.   Constitutional:       Appearance: She is well-developed.   Neck:      Trachea: No tracheal deviation.   Musculoskeletal:      Comments: Lumbar Spine Exam:  Tender to palpation over the lumbar paraspinal musculature Yes  Limited range of motion secondary to pain Yes  Facet loading positive: bilateral  Facets tender to palpation: bilateral  Straight leg raise test positive: Equivocal       Neurological:      General: No focal deficit present.      Mental Status: She is alert.      Sensory: Sensory deficit present.      Motor: No weakness.      Comments: Intermittent decree sensation across the left anterior thigh           Assessment/Plan   Problems Addressed this Visit     None      Visit Diagnoses     Lumbosacral spondylosis without myelopathy    -  Primary    Chronic pain of left thumb        Relevant Orders    XR Hand 3+ View Left (Completed)      Diagnoses       Codes Comments    Lumbosacral spondylosis without myelopathy    -  Primary ICD-10-CM: M47.817  ICD-9-CM: 721.3     Chronic pain of left thumb     ICD-10-CM: M79.645, G89.29  ICD-9-CM: 729.5, 338.29           Plan:  1. We will plan for bilateral three-level lumbar medial branch blocks as I agree that she likely has a significant component of some lumbosacral spondylosis  2. With respect to her anterior thigh pain, she may benefit from lumbar epidural steroid injection in the future  3. We will also obtain an x-ray of the left as she is complaining of some left thumb pain and swelling  4. We will start gabapentin " 100 mg 3 times daily. She is complaining of some leg pain which may be a component of bilateral peripheral neuropathy  --- Follow-up next available for bilateral three-level lumbar medial branch blocks           INSPECT REPORT    As part of the patient's treatment plan, I may be prescribing controlled substances. The patient has been made aware of appropriate use of such medications, including potential risk of somnolence, limited ability to drive and/or work safely, and the potential for dependence or overdose. It has also bee made clear that these medications are for use by this patient only, without concomitant use of alcohol or other substances unless prescribed.     Patient has completed prescribing agreement detailing terms of continued prescribing of controlled substances, including monitoring YASMINE reports, urine drug screening, and pill counts if necessary. The patient is aware that inappropriate use will results in cessation of prescribing such medications.    INSPECT report has been reviewed and scanned into the patient's chart.    As the clinician, I personally reviewed the INSPECT from 7/6/2021.    History and physical exam exhibit continued safe and appropriate use of controlled substances.      EMR Dragon/Transcription disclaimer:   Much of this encounter note is an electronic transcription/translation of spoken language to printed text. The electronic translation of spoken language may permit erroneous, or at times, nonsensical words or phrases to be inadvertently transcribed; Although I have reviewed the note for such errors, some may still exist.

## 2021-07-09 NOTE — TELEPHONE ENCOUNTER
PATIENT IS SCHEDULED FOR B/L 3 LEVEL LUMBAR MBB ON 7/15/2021. PLEASE CHECK INSURANCE. THANK YOU

## 2021-07-12 ENCOUNTER — PRIOR AUTHORIZATION (OUTPATIENT)
Dept: PAIN MEDICINE | Facility: CLINIC | Age: 76
End: 2021-07-12

## 2021-07-14 NOTE — TELEPHONE ENCOUNTER
Shes not approved yet and no decision has been made. So probably need to cx appt tomorrow and wait for decision.

## 2021-07-15 ENCOUNTER — APPOINTMENT (OUTPATIENT)
Dept: PAIN MEDICINE | Facility: HOSPITAL | Age: 76
End: 2021-07-15

## 2021-07-15 NOTE — TELEPHONE ENCOUNTER
[facility] [id number] FR BILATERAL LMBB 2 LEVELS EFF 7/15-7/28/21  Please Call patient and re-schedule. Thx!

## 2021-07-16 ENCOUNTER — HOSPITAL ENCOUNTER (OUTPATIENT)
Dept: PAIN MEDICINE | Facility: HOSPITAL | Age: 76
Discharge: HOME | End: 2021-07-16

## 2021-07-16 ENCOUNTER — HOSPITAL ENCOUNTER (OUTPATIENT)
Dept: GENERAL RADIOLOGY | Facility: HOSPITAL | Age: 76
Discharge: HOME | End: 2021-07-16

## 2021-07-16 VITALS
DIASTOLIC BLOOD PRESSURE: 77 MMHG | SYSTOLIC BLOOD PRESSURE: 142 MMHG | HEIGHT: 60 IN | TEMPERATURE: 97.1 F | RESPIRATION RATE: 16 BRPM | OXYGEN SATURATION: 99 % | BODY MASS INDEX: 36.71 KG/M2 | WEIGHT: 187 LBS | HEART RATE: 66 BPM

## 2021-07-16 DIAGNOSIS — M51.36 DDD (DEGENERATIVE DISC DISEASE), LUMBAR: ICD-10-CM

## 2021-07-16 DIAGNOSIS — M54.50 LOW BACK PAIN: ICD-10-CM

## 2021-07-16 PROCEDURE — 64493 INJ PARAVERT F JNT L/S 1 LEV: CPT | Performed by: STUDENT IN AN ORGANIZED HEALTH CARE EDUCATION/TRAINING PROGRAM

## 2021-07-16 PROCEDURE — 64494 INJ PARAVERT F JNT L/S 2 LEV: CPT | Performed by: STUDENT IN AN ORGANIZED HEALTH CARE EDUCATION/TRAINING PROGRAM

## 2021-07-16 PROCEDURE — 77003 FLUOROGUIDE FOR SPINE INJECT: CPT

## 2021-07-16 PROCEDURE — 25010000002 METHYLPREDNISOLONE PER 40 MG: Performed by: STUDENT IN AN ORGANIZED HEALTH CARE EDUCATION/TRAINING PROGRAM

## 2021-07-16 RX ORDER — BUPIVACAINE HYDROCHLORIDE 5 MG/ML
10 INJECTION, SOLUTION EPIDURAL; INTRACAUDAL ONCE
Status: COMPLETED | OUTPATIENT
Start: 2021-07-16 | End: 2021-07-16

## 2021-07-16 RX ORDER — METHYLPREDNISOLONE ACETATE 40 MG/ML
40 INJECTION, SUSPENSION INTRA-ARTICULAR; INTRALESIONAL; INTRAMUSCULAR; SOFT TISSUE ONCE
Status: COMPLETED | OUTPATIENT
Start: 2021-07-16 | End: 2021-07-16

## 2021-07-16 RX ADMIN — METHYLPREDNISOLONE ACETATE 40 MG: 40 INJECTION, SUSPENSION INTRA-ARTICULAR; INTRALESIONAL; INTRAMUSCULAR; SOFT TISSUE at 13:54

## 2021-07-16 RX ADMIN — BUPIVACAINE HYDROCHLORIDE 10 ML: 5 INJECTION, SOLUTION EPIDURAL; INTRACAUDAL; PERINEURAL at 13:54

## 2021-07-16 NOTE — PROCEDURES
Bilateral L3-5 Lumbar Medial Branch Blockade  [facility]      PREOPERATIVE DIAGNOSIS:  Lumbar spondylosis without myelopathy    POSTOPERATIVE DIAGNOSIS:  Lumbar spondylosis without myelopathy    PROCEDURE:   Diagnostic Bilateral Lumbar Medial Branch Nerve Blockades, with fluoroscopy:  L3, L4, and L5 nerves (at the L4 & L5 transverse processes and the sacral alar groove) to block facet joints L4-5, and L5-S1  1. 28370-73 -- Bilateral Lumbar Facet blocks, 1st Level  2. [id number] -- Bilateral Lumbar Facet blocks, 2nd  Level    PRE-PROCEDURE DISCUSSION WITH PATIENT:    Risks and complications were discussed with the patient prior to starting the procedure and informed consent was obtained.      SURGEON:  Javi Sheffield MD    REASON FOR PROCEDURE:    The patient complains of pain that seems to have a significant axial component, Tenderness of the affected facet joints on palpation, Pain on extension of the lumbar spine and Positive lumbar facet loading maneuver    SEDATION:  Patient declined administration of moderate sedation    ANESTHETIC:  Marcaine 0.25%  STEROID:  Methylprednisolone (DEPO MEDROL) 40mg/ml  TOTAL VOLUME OF SOLUTION:  6ml    DESCRIPTON OF PROCEDURE:  After obtaining informed consent, IV access was not obtained in the preoperative area.   The patient was taken to the operating room.  The patient was placed in the prone position with a pillow under the abdomen. All pressure points were well padded.  the lumbosacral area was prepped with Chloraprep and draped in a sterile fashion. Under fluoroscopic guidance the transverse processes of the L4 and L5 vertebrae at the junctions of the superior articular processes were identified on the right. Also identified was the groove between the ala and the superior articular process of the sacrum on the ipsilateral side.  Skin and subcutaneous tissue were anesthetized with 1% lidocaine above each of these points. A 25-gauge spinal needle was introduced  under fluoroscopic guidance at the above junctions. Aspiration was negative for blood and CSF.  After confirming the position of the needle with fluoroscope in all views,a total of 1 mL of the anesthetic solution noted above was injected at each of these points.  Needles were removed intact from each of the areas.  A similar procedure was repeated to block the L3, L4, and L5 nerves on the contralateral side.   Onset of analgesia was noted.  Vital signs remained stable throughout.      ESTIMATED BLOOD LOSS:  <5 mL  SPECIMENS:  none    COMPLICATIONS:   No complications were noted.    TOLERANCE & DISCHARGE CONDITION:    The patient tolerated the procedure well.  The patient was transported to the recovery area without difficulties.  The patient was discharged to home under the care of family in stable and satisfactory condition.    PLAN OF CARE:  1. The patient was given our standard instruction sheet.  2. We discussed that Lumbar Medial Branch Blockade is a diagnostic procedure in consideration for radiofrequency ablation if two diagnostic procedures prove to be positive for significant benefit.  If sustained relief of 6 to eight weeks is obtained, then an alternative plan could be therapeutic lumbar branch blockades.  3. The patient is asked to keep a pain log each hour for 8 hours after the procedure today.  4. The patient will  Return to clinic 2-3 wks.  5. The patient will resume all medications as per the medication reconciliation sheet.

## 2021-07-30 ENCOUNTER — TELEPHONE (OUTPATIENT)
Dept: PAIN MEDICINE | Facility: HOSPITAL | Age: 76
End: 2021-07-30

## 2021-07-30 ENCOUNTER — OFFICE VISIT (OUTPATIENT)
Dept: PAIN MEDICINE | Facility: CLINIC | Age: 76
End: 2021-07-30

## 2021-07-30 VITALS
BODY MASS INDEX: 36.71 KG/M2 | TEMPERATURE: 97.1 F | HEIGHT: 60 IN | SYSTOLIC BLOOD PRESSURE: 149 MMHG | OXYGEN SATURATION: 97 % | DIASTOLIC BLOOD PRESSURE: 81 MMHG | HEART RATE: 70 BPM | RESPIRATION RATE: 16 BRPM | WEIGHT: 187 LBS

## 2021-07-30 DIAGNOSIS — M47.817 LUMBOSACRAL SPONDYLOSIS WITHOUT MYELOPATHY: Primary | ICD-10-CM

## 2021-07-30 PROCEDURE — G0463 HOSPITAL OUTPT CLINIC VISIT: HCPCS | Performed by: STUDENT IN AN ORGANIZED HEALTH CARE EDUCATION/TRAINING PROGRAM

## 2021-07-30 PROCEDURE — 99213 OFFICE O/P EST LOW 20 MIN: CPT | Performed by: STUDENT IN AN ORGANIZED HEALTH CARE EDUCATION/TRAINING PROGRAM

## 2021-07-30 NOTE — PROGRESS NOTES
CHIEF COMPLAINT  Chief Complaint   Patient presents with   • Back Pain     LOW BACK . MBB HELPED FOR 3 DAYS.        Primary Care  Marija Cantu   Stephani Daniels is a 76 y.o. female  who presents for axial type low back pain with associated left lower extremity anterior thigh radiculopathy. Additionally, she complains of left groin pain after a fall. She states that she has had the back pain ongoing for many years. She is evaluated by a neurosurgeon who felt like she likely had a component of lumbosacral spondylosis and would benefit from medial branch blocks. She states that she received some form of injection in the past which beneficial however the records are unable to be obtained. She denies any red flags such as loss of bowel or bladder denies any significant weakness. She does have baseline shortness of breath due to either COPD or asthma.    Back Pain  Associated symptoms include headaches and numbness. Pertinent negatives include no weakness.        Location: Axial low back, left anterior thigh, left thumb  Onset: Years ago  Duration: Progressively worsening  Timing: Constant throughout the day  Quality: Sharp, stabbing. Thigh numb and tingly  Severity: Today: 5       Last Week: 5       Worst: 7  Modifying Factors: The pain is worse with movement physical activity and standing. The pain improves with sitting and resting    Physical Therapy: no    Interval Update 07/30/2021: She reports 80 to 90% pain relief for approximately 3 days following the medial branch block with return of symptoms as before    The following portions of the patient's history were reviewed and updated as appropriate: allergies, current medications, past family history, past medical history, past social history, past surgical history and problem list.      Current Outpatient Medications:   •  acetaminophen (TYLENOL) 500 MG tablet, Take 500 mg by mouth 2 (two) times a day., Disp: , Rfl:   •  albuterol sulfate HFA  (ProAir HFA) 108 (90 Base) MCG/ACT inhaler, Inhale 2 puffs Every 6 (Six) Hours As Needed for Wheezing (or cough)., Disp: , Rfl:   •  aspirin 81 MG chewable tablet, Chew 81 mg Daily., Disp: , Rfl:   •  Calcium Citrate-Vitamin D (CALCIUM CITRATE + D3 PO), Take 1 tablet by mouth Daily., Disp: , Rfl:   •  cetirizine (zyrTEC) 10 MG tablet, Take 10 mg by mouth Every Night., Disp: , Rfl:   •  etodolac (LODINE) 400 MG tablet, Take 400 mg by mouth 2 (Two) Times a Day., Disp: , Rfl:   •  Fluticasone Furoate-Vilanterol (Breo Ellipta) 200-25 MCG/INH inhaler, Inhale 1 puff Daily., Disp: , Rfl:   •  gabapentin (NEURONTIN) 100 MG capsule, Take 1 capsule by mouth 3 (Three) Times a Day., Disp: 90 capsule, Rfl: 0  •  hydroCHLOROthiazide (HYDRODIURIL) 25 MG tablet, Take 25 mg by mouth Daily. for blood pressure, Disp: , Rfl:   •  HYDROCHLOROTHIAZIDE PO, Take 25 mg by mouth Daily., Disp: , Rfl:   •  Hypertonic Nasal Wash (SINUS RINSE KIT NA), 1 application into the nostril(s) as directed by provider Daily., Disp: , Rfl:   •  irbesartan (AVAPRO) 75 MG tablet, Take 1 tablet by mouth once daily, Disp: 90 tablet, Rfl: 0  •  levothyroxine (SYNTHROID, LEVOTHROID) 125 MCG tablet, Take 125 mcg by mouth Daily., Disp: , Rfl:   •  Magnesium 250 MG tablet, Take 1 tablet by mouth Every Night., Disp: , Rfl:   •  Menthol, Topical Analgesic, 4 % gel, Apply 1 application topically As Needed., Disp: , Rfl:   •  metoprolol succinate XL (TOPROL-XL) 25 MG 24 hr tablet, Take 12.5 mg by mouth Daily., Disp: , Rfl:   •  montelukast (SINGULAIR) 10 MG tablet, Take 10 mg by mouth Every Night., Disp: , Rfl:   •  multivitamin with minerals tablet tablet, Take 1 tablet by mouth Daily., Disp: , Rfl:   •  pantoprazole (Protonix) 40 MG EC tablet, Take 40 mg by mouth Daily., Disp: , Rfl:   •  polyethylene glycol (MiraLax) 17 g packet, Take 17 g by mouth Every Night., Disp: , Rfl:   •  potassium chloride 10 MEQ CR tablet, Take 1 tablet by mouth once daily, Disp: 90  "tablet, Rfl: 0  •  simvastatin (ZOCOR) 40 MG tablet, Take 40 mg by mouth Every Night., Disp: , Rfl:     Review of Systems   Eyes: Negative for photophobia and visual disturbance.   Musculoskeletal: Positive for back pain, gait problem and joint swelling.   Neurological: Positive for numbness and headaches. Negative for weakness.       Vitals:    07/30/21 1045   BP: 149/81   Pulse: 70   Resp: 16   Temp: 97.1 °F (36.2 °C)   SpO2: 97%   Weight: 84.8 kg (187 lb)   Height: 152.4 cm (60\")   PainSc:   8       Objective   Physical Exam  Vitals and nursing note reviewed.   Constitutional:       Appearance: She is well-developed.   Neck:      Trachea: No tracheal deviation.   Musculoskeletal:      Comments: Lumbar Spine Exam:  Tender to palpation over the lumbar paraspinal musculature Yes  Limited range of motion secondary to pain Yes  Facet loading positive: bilateral  Facets tender to palpation: bilateral  Straight leg raise test positive: Equivocal       Neurological:      General: No focal deficit present.      Mental Status: She is alert.      Sensory: Sensory deficit present.      Motor: No weakness.      Comments: Intermittent decree sensation across the left anterior thigh           Assessment/Plan   Problems Addressed this Visit     None      Visit Diagnoses     Lumbosacral spondylosis without myelopathy    -  Primary      Diagnoses       Codes Comments    Lumbosacral spondylosis without myelopathy    -  Primary ICD-10-CM: M47.817  ICD-9-CM: 721.3           Plan:  1. Plan for second repeat diagnostic medial branch block  2. If she continues to get good benefit as before, will plan for radiofrequency ablation  --- Follow-up next available for bilateral three-level lumbar medial branch blocks           INSPECT REPORT    As part of the patient's treatment plan, I may be prescribing controlled substances. The patient has been made aware of appropriate use of such medications, including potential risk of somnolence, " limited ability to drive and/or work safely, and the potential for dependence or overdose. It has also bee made clear that these medications are for use by this patient only, without concomitant use of alcohol or other substances unless prescribed.     Patient has completed prescribing agreement detailing terms of continued prescribing of controlled substances, including monitoring YASMINE reports, urine drug screening, and pill counts if necessary. The patient is aware that inappropriate use will results in cessation of prescribing such medications.    INSPECT report has been reviewed and scanned into the patient's chart.    As the clinician, I personally reviewed the INSPECT from 7/29/2021.    History and physical exam exhibit continued safe and appropriate use of controlled substances.      EMR Dragon/Transcription disclaimer:   Much of this encounter note is an electronic transcription/translation of spoken language to printed text. The electronic translation of spoken language may permit erroneous, or at times, nonsensical words or phrases to be inadvertently transcribed; Although I have reviewed the note for such errors, some may still exist.

## 2021-08-10 ENCOUNTER — OUTSIDE FACILITY SERVICE (OUTPATIENT)
Dept: CARDIOLOGY | Facility: CLINIC | Age: 76
End: 2021-08-10

## 2021-08-10 PROCEDURE — 99214 OFFICE O/P EST MOD 30 MIN: CPT | Performed by: INTERNAL MEDICINE

## 2021-08-12 ENCOUNTER — TELEPHONE (OUTPATIENT)
Dept: CARDIOLOGY | Facility: CLINIC | Age: 76
End: 2021-08-12

## 2021-08-12 RX ORDER — IRBESARTAN 75 MG/1
TABLET ORAL
Qty: 90 TABLET | Refills: 0 | Status: SHIPPED | OUTPATIENT
Start: 2021-08-12 | End: 2021-08-16

## 2021-08-12 RX ORDER — IRBESARTAN 75 MG/1
TABLET ORAL
Qty: 90 TABLET | Refills: 0 | Status: SHIPPED | OUTPATIENT
Start: 2021-08-12 | End: 2021-08-12

## 2021-08-12 NOTE — TELEPHONE ENCOUNTER
Rx Refill Note  Requested Prescriptions     Pending Prescriptions Disp Refills   • irbesartan (AVAPRO) 75 MG tablet [Pharmacy Med Name: Irbesartan 75 MG Oral Tablet] 90 tablet 0     Sig: Take 1 tablet by mouth once daily      Last office visit with prescribing clinician: 08/10/2021 - [facility] patient      Next office visit with prescribing clinician: patient will be followed by  on 02/15/2022 in [facility]     Labs done 08/05/2021 in [facility] [place]  08/12/21, 10:38 EDT

## 2021-08-16 RX ORDER — IRBESARTAN 75 MG/1
TABLET ORAL
Qty: 90 TABLET | Refills: 3 | Status: SHIPPED | OUTPATIENT
Start: 2021-08-16 | End: 2022-12-21 | Stop reason: SDUPTHER

## 2021-08-16 NOTE — TELEPHONE ENCOUNTER
Rx Refill Note  Requested Prescriptions     Pending Prescriptions Disp Refills   • irbesartan (AVAPRO) 75 MG tablet [Pharmacy Med Name: Irbesartan 75 MG Oral Tablet] 90 tablet 0     Sig: Take 1 tablet by mouth once daily      Last office visit with prescribing clinician: 08/10/21  Next office visit with prescribing clinician: 02/15/22    [facility] - LABS (08/05/2021)         Georgia Olmstead MA  08/16/21, 08:48 EDT

## 2021-10-01 RX ORDER — POTASSIUM CHLORIDE 750 MG/1
TABLET, FILM COATED, EXTENDED RELEASE ORAL
Qty: 90 TABLET | Refills: 1 | Status: SHIPPED | OUTPATIENT
Start: 2021-10-01 | End: 2022-03-25

## 2022-02-15 ENCOUNTER — OUTSIDE FACILITY SERVICE (OUTPATIENT)
Dept: CARDIOLOGY | Facility: CLINIC | Age: 77
End: 2022-02-15

## 2022-02-15 DIAGNOSIS — I71.20 THORACIC AORTIC ANEURYSM WITHOUT RUPTURE: ICD-10-CM

## 2022-02-15 DIAGNOSIS — I25.110 CORONARY ARTERY DISEASE INVOLVING NATIVE CORONARY ARTERY OF NATIVE HEART WITH UNSTABLE ANGINA PECTORIS: Primary | ICD-10-CM

## 2022-02-15 PROCEDURE — 99214 OFFICE O/P EST MOD 30 MIN: CPT | Performed by: INTERNAL MEDICINE

## 2022-02-17 RX ORDER — TRAMADOL HYDROCHLORIDE 50 MG/1
50 TABLET ORAL 2 TIMES DAILY
COMMUNITY
Start: 2021-12-29

## 2022-02-24 ENCOUNTER — HOSPITAL ENCOUNTER (OUTPATIENT)
Dept: CT IMAGING | Facility: HOSPITAL | Age: 77
Discharge: HOME | End: 2022-02-24
Admitting: INTERNAL MEDICINE

## 2022-02-24 DIAGNOSIS — I25.110 CORONARY ARTERY DISEASE INVOLVING NATIVE CORONARY ARTERY OF NATIVE HEART WITH UNSTABLE ANGINA PECTORIS: ICD-10-CM

## 2022-02-24 DIAGNOSIS — I71.20 THORACIC AORTIC ANEURYSM WITHOUT RUPTURE: ICD-10-CM

## 2022-02-24 PROCEDURE — 71250 CT THORAX DX C-: CPT

## 2022-02-28 ENCOUNTER — TELEPHONE (OUTPATIENT)
Dept: CARDIOLOGY | Facility: CLINIC | Age: 77
End: 2022-02-28

## 2022-02-28 NOTE — TELEPHONE ENCOUNTER
Called and notified patient verbalized understanding    ----------------------------    Ruy Hawkins MD [place], [place], [place]    4 cm ascending aortic aneurysm no significant change from before continue the same therapy follow-up as scheduled

## 2022-03-25 RX ORDER — POTASSIUM CHLORIDE 750 MG/1
TABLET, FILM COATED, EXTENDED RELEASE ORAL
Qty: 90 TABLET | Refills: 0 | Status: SHIPPED | OUTPATIENT
Start: 2022-03-25 | End: 2023-03-16

## 2022-06-24 ENCOUNTER — APPOINTMENT (OUTPATIENT)
Dept: MRI IMAGING | Facility: HOSPITAL | Age: 77
End: 2022-06-24

## 2022-08-16 ENCOUNTER — OUTSIDE FACILITY SERVICE (OUTPATIENT)
Dept: CARDIOLOGY | Facility: CLINIC | Age: 77
End: 2022-08-16

## 2022-08-16 PROCEDURE — 99214 OFFICE O/P EST MOD 30 MIN: CPT | Performed by: INTERNAL MEDICINE

## 2022-12-21 RX ORDER — IRBESARTAN 75 MG/1
75 TABLET ORAL DAILY
Qty: 90 TABLET | Refills: 3 | Status: SHIPPED | OUTPATIENT
Start: 2022-12-21

## 2023-03-14 ENCOUNTER — OUTSIDE FACILITY SERVICE (OUTPATIENT)
Dept: CARDIOLOGY | Facility: CLINIC | Age: 78
End: 2023-03-14
Payer: MEDICARE

## 2023-03-14 PROCEDURE — 99214 OFFICE O/P EST MOD 30 MIN: CPT | Performed by: INTERNAL MEDICINE

## 2023-10-10 ENCOUNTER — OUTSIDE FACILITY SERVICE (OUTPATIENT)
Dept: CARDIOLOGY | Facility: CLINIC | Age: 78
End: 2023-10-10
Payer: MEDICARE

## 2023-10-10 PROCEDURE — 99214 OFFICE O/P EST MOD 30 MIN: CPT | Performed by: INTERNAL MEDICINE

## 2024-01-09 RX ORDER — IRBESARTAN 75 MG/1
75 TABLET ORAL DAILY
Qty: 90 TABLET | Refills: 2 | Status: SHIPPED | OUTPATIENT
Start: 2024-01-09

## 2024-10-23 RX ORDER — IRBESARTAN 75 MG/1
75 TABLET ORAL DAILY
Qty: 90 TABLET | Refills: 0 | Status: SHIPPED | OUTPATIENT
Start: 2024-10-23

## 2025-01-16 RX ORDER — IRBESARTAN 75 MG/1
75 TABLET ORAL DAILY
Qty: 90 TABLET | Refills: 0 | Status: SHIPPED | OUTPATIENT
Start: 2025-01-16

## 2025-04-22 RX ORDER — IRBESARTAN 75 MG/1
75 TABLET ORAL DAILY
Qty: 90 TABLET | Refills: 1 | Status: SHIPPED | OUTPATIENT
Start: 2025-04-22

## (undated) DEVICE — PINNACLE INTRODUCER SHEATH: Brand: PINNACLE

## (undated) DEVICE — CATH DIAG IMPULSE FL4 6F 100CM

## (undated) DEVICE — GW PTFE EMERALD HEPCOAT FC J TIP STD .035 3MM 150CM

## (undated) DEVICE — CATH DIAG IMPULSE FR4 6F 100CM

## (undated) DEVICE — SKIN PREP TRAY W/CHG: Brand: MEDLINE INDUSTRIES, INC.

## (undated) DEVICE — PK TRY HEART CATH 50

## (undated) DEVICE — RADIFOCUS OBTURATOR: Brand: RADIFOCUS